# Patient Record
Sex: FEMALE | Race: WHITE | NOT HISPANIC OR LATINO | URBAN - METROPOLITAN AREA
[De-identification: names, ages, dates, MRNs, and addresses within clinical notes are randomized per-mention and may not be internally consistent; named-entity substitution may affect disease eponyms.]

---

## 2018-03-29 ENCOUNTER — RECORDS - HEALTHEAST (OUTPATIENT)
Dept: GENERAL RADIOLOGY | Facility: CLINIC | Age: 63
End: 2018-03-29

## 2018-03-29 ENCOUNTER — OFFICE VISIT - HEALTHEAST (OUTPATIENT)
Dept: FAMILY MEDICINE | Facility: CLINIC | Age: 63
End: 2018-03-29

## 2018-03-29 DIAGNOSIS — R05.9 COUGH: ICD-10-CM

## 2018-03-29 RX ORDER — ROPINIROLE 0.5 MG/1
TABLET, FILM COATED ORAL
Refills: 2 | Status: SHIPPED | COMMUNITY
Start: 2018-02-09

## 2018-03-29 RX ORDER — BENZONATATE 100 MG/1
100 CAPSULE ORAL 3 TIMES DAILY PRN
Status: SHIPPED | COMMUNITY
Start: 2018-03-29

## 2018-03-29 RX ORDER — IBANDRONATE SODIUM 150 MG/1
TABLET, FILM COATED ORAL
Refills: 0 | Status: SHIPPED | COMMUNITY
Start: 2018-01-31

## 2018-03-29 ASSESSMENT — MIFFLIN-ST. JEOR: SCORE: 1214.93

## 2021-06-01 VITALS — BODY MASS INDEX: 20.76 KG/M2 | HEIGHT: 68 IN | WEIGHT: 137 LBS

## 2021-06-17 NOTE — PROGRESS NOTES
Assessment and Plan     Abby was seen today for cough and back pain.    Diagnoses and all orders for this visit:    Cough  -     XR Chest 2 Views; Future         HPI     Chief Complaint   Patient presents with     Cough     chest pain when breathing deep, x 3 days      Back Pain       Abby Hernandes is a 62 y.o. female seen today for a persistent cough with sharp chest pain that increases with deep inspiration or cough.  The cough began about 2 weeks ago when she was experiencing flulike symptoms, including fatigue, headache, nausea, diarrhea, myalgias. Aside from the cough and fatigue, all symptoms have resolved.  The cough has been persistent throughout the 2 weeks and seems worse over the last 3 days with the sharp chest pains across the anterior costal margin bilaterally.  She acknowledges some occasional mild dyspnea although she is unable to identify specific triggers aside from coughing.  Denies recent fevers or chills.    She has never smoked.    Current Outpatient Prescriptions   Medication Sig Dispense Refill     benzonatate (TESSALON) 100 MG capsule Take 100 mg by mouth 3 (three) times a day as needed for cough.       ibandronate (BONIVA) 150 mg tablet TK 1 T PO MONTHLY  0     rOPINIRole (REQUIP) 0.5 MG tablet TK 1 T PO QHS  2     No current facility-administered medications for this visit.         Reviewed and updated: medical history, medications and allergies.     Review of Systems     General: Denies fever, chills.  Acknowledges persistent fatigue.  Cardiovascular: Denies chest pain, dyspnea on exertion, palpitations.  Respiratory: Denies dyspnea, cough, wheezing.  GI: Denies nausea, vomiting, diarrhea, constipation.  : Denies dysuria, polyuria.     Objective     Vitals:    03/29/18 1932   BP: 108/60   Patient Site: Right Arm   Patient Position: Sitting   Cuff Size: Adult Regular   Pulse: (!) 112   Resp: 22   Temp: 98.1  F (36.7  C)   TempSrc: Oral   SpO2: 98%   Weight: 137 lb (62.1 kg)  "  Height: 5' 8\" (1.727 m)        Reviewed vital signs.  General: Appears calm, comfortable. Answers questions quickly and appropriately with clear speech. No apparent distress.  Skin: Pink, warm, dry.  HENT: Normocephalic, atraumatic. TMs clear bilaterally. No lymphadenopathy.  Neck: Supple, without lymphadenopathy or thyromegaly.  Heart: Regular rate and rhythm, clear S1/S2 without murmur, rub, or gallop.  Lungs: Clear and equal bilaterally. Normal respiratory effort with mild tachypnea.  Neuro: Memory and cognition appear normal. Normal gait.  Psych: Mood and affect appear normal.     CXR: Opacity in the posterior costophrenic angle concerning for effusion.    Radiology read:  Xr Chest 2 Views    Result Date: 3/29/2018  XR CHEST 2 VIEWS 3/29/2018 8:18 PM INDICATION: Cough COMPARISON: None. FINDINGS: Focal opacities are noted in the left lower lobe posteromedially  silhouetting the costophrenic angle with trace effusion. If there are appropriate clinical symptoms, findings would be most consistent with a common bronchopneumonia. Followup in 3-4 weeks recommended. If however, patient has chest pain or a simple cough, need to consider pulmonary emboli with infarct in the appropriate setting. Right lung is clear. Heart and pulmonary vascularity are normal. NOTE: ABNORMAL REPORT THE DICTATION ABOVE DESCRIBES AN ABNORMALITY FOR WHICH FOLLOW-UP IS NEEDED.        Medical Decision-Making     Abby is a well-appearing 62-year-old female presents with a cough persistent for 2 weeks following flulike illness.  Given her mild tachypnea, tachycardia, and persistent cough, I ordered a CXR which showed some posteriomedial opacities that radiology described as potentially either pneumonia or infarct.  Her symptoms could be consistent with either diagnosis, so I requested that she go directly to the ED for further evaluation.  She agreed to go directly there by personal vehicle.    Discussed benefit vs risk of medications, " dosing, side effects.  Patient was able to verbalize understanding.  After visit summary was provided for patient.     Juan Manuel Mancini PA-C